# Patient Record
Sex: FEMALE | Race: WHITE | NOT HISPANIC OR LATINO | URBAN - METROPOLITAN AREA
[De-identification: names, ages, dates, MRNs, and addresses within clinical notes are randomized per-mention and may not be internally consistent; named-entity substitution may affect disease eponyms.]

---

## 2023-09-11 PROBLEM — R29.898 COGWHEEL RIGIDITY: Status: ACTIVE | Noted: 2023-09-11

## 2023-09-11 PROBLEM — R87.612 LOW GRADE SQUAMOUS INTRAEPITHELIAL LESION (LGSIL) ON CERVICAL PAP SMEAR: Status: ACTIVE | Noted: 2023-09-11

## 2023-09-11 PROBLEM — N92.6 IRREGULAR MENSTRUAL CYCLE: Status: ACTIVE | Noted: 2023-09-11

## 2023-09-11 PROBLEM — G47.10 HYPERSOMNOLENCE: Status: ACTIVE | Noted: 2023-09-11

## 2023-09-11 PROBLEM — G47.33 OBSTRUCTIVE SLEEP APNEA: Status: ACTIVE | Noted: 2023-09-11

## 2023-09-11 PROBLEM — F31.9 BIPOLAR DISORDER (MULTI): Status: ACTIVE | Noted: 2023-09-11

## 2023-09-11 PROBLEM — G56.00 CARPAL TUNNEL SYNDROME: Status: ACTIVE | Noted: 2023-09-11

## 2023-09-11 PROBLEM — R92.8 ABNORMAL MAMMOGRAM: Status: ACTIVE | Noted: 2023-09-11

## 2023-09-11 PROBLEM — M19.90 ARTHRITIS: Status: ACTIVE | Noted: 2023-09-11

## 2023-09-11 PROBLEM — R53.1 WEAKNESS: Status: ACTIVE | Noted: 2023-09-11

## 2023-09-11 PROBLEM — G20.C PARKINSONISM (MULTI): Status: ACTIVE | Noted: 2023-09-11

## 2023-09-11 PROBLEM — I10 HYPERTENSION: Status: ACTIVE | Noted: 2023-09-11

## 2023-09-11 PROBLEM — K52.9 GASTROENTERITIS: Status: ACTIVE | Noted: 2023-09-11

## 2023-09-11 PROBLEM — K21.9 GERD (GASTROESOPHAGEAL REFLUX DISEASE): Status: ACTIVE | Noted: 2023-09-11

## 2023-09-11 PROBLEM — R49.0 HOARSENESS: Status: ACTIVE | Noted: 2023-09-11

## 2023-09-11 PROBLEM — F32.A DEPRESSION: Status: ACTIVE | Noted: 2023-09-11

## 2023-09-11 PROBLEM — M79.7 FIBROMYALGIA: Status: ACTIVE | Noted: 2023-09-11

## 2023-09-11 PROBLEM — R87.69 ABNORMAL CYTOLOGICAL FINDINGS IN FEMALE GENITAL ORGANS: Status: ACTIVE | Noted: 2023-09-11

## 2023-09-11 PROBLEM — R53.83 FATIGUE: Status: ACTIVE | Noted: 2023-09-11

## 2023-09-11 RX ORDER — VENLAFAXINE HYDROCHLORIDE 150 MG/1
1 CAPSULE, EXTENDED RELEASE ORAL EVERY MORNING
COMMUNITY
Start: 2017-03-02 | End: 2023-10-13

## 2023-09-11 RX ORDER — DEXMETHYLPHENIDATE HYDROCHLORIDE 20 MG/1
CAPSULE, EXTENDED RELEASE ORAL
COMMUNITY
End: 2023-10-13

## 2023-09-11 RX ORDER — CLONAZEPAM 1 MG/1
2 TABLET ORAL DAILY PRN
COMMUNITY
Start: 2016-07-26 | End: 2023-10-13

## 2023-09-11 RX ORDER — TRAZODONE HYDROCHLORIDE 100 MG/1
TABLET ORAL
COMMUNITY
End: 2023-10-13

## 2023-10-13 ENCOUNTER — OFFICE VISIT (OUTPATIENT)
Dept: OBSTETRICS AND GYNECOLOGY | Facility: CLINIC | Age: 58
End: 2023-10-13
Payer: COMMERCIAL

## 2023-10-13 VITALS
DIASTOLIC BLOOD PRESSURE: 89 MMHG | HEART RATE: 76 BPM | BODY MASS INDEX: 37.54 KG/M2 | SYSTOLIC BLOOD PRESSURE: 164 MMHG | WEIGHT: 204 LBS | HEIGHT: 62 IN

## 2023-10-13 DIAGNOSIS — Z01.419 ENCOUNTER FOR GYNECOLOGICAL EXAMINATION WITHOUT ABNORMAL FINDING: ICD-10-CM

## 2023-10-13 DIAGNOSIS — Z12.31 VISIT FOR SCREENING MAMMOGRAM: ICD-10-CM

## 2023-10-13 DIAGNOSIS — N95.1 VAGINAL DRYNESS, MENOPAUSAL: ICD-10-CM

## 2023-10-13 DIAGNOSIS — R10.2 PELVIC PAIN: Primary | ICD-10-CM

## 2023-10-13 PROCEDURE — 1036F TOBACCO NON-USER: CPT | Performed by: OBSTETRICS & GYNECOLOGY

## 2023-10-13 PROCEDURE — 3077F SYST BP >= 140 MM HG: CPT | Performed by: OBSTETRICS & GYNECOLOGY

## 2023-10-13 PROCEDURE — 3079F DIAST BP 80-89 MM HG: CPT | Performed by: OBSTETRICS & GYNECOLOGY

## 2023-10-13 PROCEDURE — 99396 PREV VISIT EST AGE 40-64: CPT | Performed by: OBSTETRICS & GYNECOLOGY

## 2023-10-13 RX ORDER — ESTRADIOL 0.1 MG/G
0.5 CREAM VAGINAL NIGHTLY
Qty: 42.5 G | Refills: 2 | Status: SHIPPED | OUTPATIENT
Start: 2023-10-13 | End: 2024-10-12

## 2023-10-13 NOTE — PROGRESS NOTES
Subjective   Liz Dooley is a 58 y.o. female here for a routine exam. Current complaints: Patient has been stressed regarding her son with special needs now having new [place] seizures.  She does notice a bilateral lower abdominal discomfort.  She is current with her colonoscopy.   She denies any postmenopausal bleeding.  No change in bowel habits, no dysuria or vaginal discharge.  She is  and sexually active.  She has been having some discomfort with intercourse which she attributes to dryness.. Personal health questionnaire reviewed: yes.     Gynecologic History  No LMP recorded.  Contraception: post menopausal status  Last Pap: 10/2021. Results were: normal  Last mammogram: 22. Results were: normal    Obstetric History  OB History    Para Term  AB Living   2 2           SAB IAB Ectopic Multiple Live Births                  # Outcome Date GA Lbr Kam/2nd Weight Sex Delivery Anes PTL Lv   2 Para            1 Para                Objective   Constitutional: Alert and in no acute distress. Well developed, well nourished.   Head and Face: Head and face: Normal.    Eyes: Normal external exam - nonicteric sclera, extraocular movements intact (EOMI) and no ptosis.   Neck: No neck asymmetry. Supple. Thyroid not enlarged and there were no palpable thyroid nodules.    Pulmonary: No respiratory distress.   Chest: Breasts: Normal appearance, no nipple discharge and no skin changes. Palpation of breasts and axillae: No palpable mass and no axillary lymphadenopathy.   Abdomen: Soft nontender; no abdominal mass palpated. No organomegaly. No hernias.   Genitourinary: External genitalia: Normal. No inguinal lymphadenopathy. Bartholin's Urethral and Skenes Glands: Normal. Urethra: Normal.  Bladder: Normal on palpation. Vagina: Normal. Cervix: Normal.  No pap sent. Uterus: Normal.  Right Adnexa/parametria: Normal.  Left Adnexa/parametria: Normal.  Inspection of Perianal Area: Normal.    Musculoskeletal: No joint swelling seen, normal movements of all extremities.   Skin: Normal skin color and pigmentation, normal skin turgor, and no rash.   Neurologic: Non-focal. Grossly intact.   Psychiatric: Alert and oriented x 3. Affect normal to patient baseline. Mood: Appropriate.  Physical Exam     Assessment/Plan   Healthy female exam.  This is a 58-year-old female with a normal exam.  No Pap smear was sent, she is high risk HPV negative.  Her routine mammogram was ordered with tomosynthesis.  We discussed her lower abdominal discomfort.   I did recommend a pelvic ultrasound to evaluate her concerns.  We discussed vaginal atrophy of menopause and estradiol cream was ordered.  I recommend beginning with a pea-sized amount at night for 2 weeks, and then 3 times weekly thereafter.  It could take 8 to 12 weeks along for the full effect.  She will call me with any concerns, I will see her routinely in 1 year.  Mammogram ordered.

## 2023-11-03 ENCOUNTER — HOSPITAL ENCOUNTER (OUTPATIENT)
Dept: RADIOLOGY | Facility: HOSPITAL | Age: 58
Discharge: HOME | End: 2023-11-03
Payer: COMMERCIAL

## 2023-11-03 DIAGNOSIS — R10.2 PELVIC PAIN: ICD-10-CM

## 2023-11-03 PROCEDURE — 76830 TRANSVAGINAL US NON-OB: CPT | Performed by: RADIOLOGY

## 2023-11-03 PROCEDURE — 76856 US EXAM PELVIC COMPLETE: CPT | Performed by: RADIOLOGY

## 2023-11-03 PROCEDURE — 76856 US EXAM PELVIC COMPLETE: CPT

## 2023-11-04 NOTE — RESULT ENCOUNTER NOTE
The ultrasound that was performed due to pain with intercourse is normal.  There are no fibroids or ovarian cysts.  In menopause, often the pain with intercourse is related to dryness and atrophy. Lubrication such as K-Y jelly, Bogdan glide or Uber lube could be helpful for friction, pelvic floor physical therapy for pelvic floor pain, and using estradiol cream for the atrophy.  If you have any new symptoms, such as bleeding, let me know.

## 2023-11-08 ENCOUNTER — APPOINTMENT (OUTPATIENT)
Dept: RADIOLOGY | Facility: HOSPITAL | Age: 58
End: 2023-11-08
Payer: COMMERCIAL

## 2024-01-03 ENCOUNTER — ANCILLARY PROCEDURE (OUTPATIENT)
Dept: RADIOLOGY | Facility: CLINIC | Age: 59
End: 2024-01-03
Payer: COMMERCIAL

## 2024-01-03 DIAGNOSIS — Z12.31 VISIT FOR SCREENING MAMMOGRAM: ICD-10-CM

## 2024-01-03 PROCEDURE — 77067 SCR MAMMO BI INCL CAD: CPT

## 2024-01-03 PROCEDURE — 77063 BREAST TOMOSYNTHESIS BI: CPT | Performed by: RADIOLOGY

## 2024-01-03 PROCEDURE — 77067 SCR MAMMO BI INCL CAD: CPT | Performed by: RADIOLOGY

## 2024-10-18 ENCOUNTER — APPOINTMENT (OUTPATIENT)
Dept: OBSTETRICS AND GYNECOLOGY | Facility: CLINIC | Age: 59
End: 2024-10-18
Payer: COMMERCIAL

## 2024-10-18 VITALS — SYSTOLIC BLOOD PRESSURE: 145 MMHG | HEART RATE: 83 BPM | DIASTOLIC BLOOD PRESSURE: 84 MMHG

## 2024-10-18 DIAGNOSIS — R39.9 UTI SYMPTOMS: ICD-10-CM

## 2024-10-18 DIAGNOSIS — Z01.411 ENCOUNTER FOR GYNECOLOGICAL EXAMINATION WITH ABNORMAL FINDING: ICD-10-CM

## 2024-10-18 DIAGNOSIS — N95.1 VAGINAL DRYNESS, MENOPAUSAL: ICD-10-CM

## 2024-10-18 DIAGNOSIS — R21 SKIN RASH: Primary | ICD-10-CM

## 2024-10-18 PROCEDURE — 88175 CYTOPATH C/V AUTO FLUID REDO: CPT

## 2024-10-18 PROCEDURE — 87624 HPV HI-RISK TYP POOLED RSLT: CPT

## 2024-10-18 RX ORDER — DOXEPIN HYDROCHLORIDE 75 MG/1
75 CAPSULE ORAL NIGHTLY
COMMUNITY

## 2024-10-18 RX ORDER — GABAPENTIN 600 MG/1
1 TABLET ORAL
COMMUNITY
Start: 2024-10-07

## 2024-10-18 RX ORDER — FUROSEMIDE 20 MG/1
20 TABLET ORAL
COMMUNITY
Start: 2024-10-16

## 2024-10-18 RX ORDER — ESTRADIOL 0.1 MG/G
0.5 CREAM VAGINAL NIGHTLY
Qty: 42.5 G | Refills: 2 | Status: SHIPPED | OUTPATIENT
Start: 2024-10-18 | End: 2025-10-18

## 2024-10-18 RX ORDER — NYSTATIN 100000 [USP'U]/G
1 POWDER TOPICAL 3 TIMES DAILY
Qty: 60 G | Refills: 2 | Status: SHIPPED | OUTPATIENT
Start: 2024-10-18 | End: 2025-10-18

## 2024-10-18 RX ORDER — BUSPIRONE HYDROCHLORIDE 10 MG/1
2 TABLET ORAL
COMMUNITY
Start: 2024-09-30

## 2024-10-18 RX ORDER — LOSARTAN POTASSIUM 25 MG/1
25 TABLET ORAL DAILY
COMMUNITY

## 2024-10-18 RX ORDER — FLUOXETINE HYDROCHLORIDE 40 MG/1
80 CAPSULE ORAL DAILY
COMMUNITY

## 2024-10-18 NOTE — PROGRESS NOTES
Subjective   Liz Dooley is a 59 y.o. female here for a routine exam.    There is no postmenopausal bleeding or discharge.  No dysuria or change in bowel habits.    We discussed her recent UTI where the symptoms were lower backache.  She feels that there may be recurrence of symptoms.    She has no fevers or chills.  Review of the chart notes a CT scan in 2024 due to pain that showed no GYN concerns.    We discussed that the visit resulted in a diagnosis of heart failure.    An ultrasound in 2023 showed a normal uterus, endometrium of 3 mm.  There is family history of ovarian cancer in a maternal cousin and a maternal grand mother.    Personal health questionnaire reviewed: yes.     Gynecologic History  No LMP recorded. Patient is postmenopausal.  Contraception: post menopausal status  Last Pap: 10/1/21. Results were: normal  Last mammogram: 1/3/24. Results were: normal    Obstetric History  OB History    Para Term  AB Living   2 2           SAB IAB Ectopic Multiple Live Births                  # Outcome Date GA Lbr Kam/2nd Weight Sex Type Anes PTL Lv   2 Para            1 Para                Objective   Constitutional: Alert and in no acute distress. Well developed, well nourished.   Head and Face: Head and face: Normal.    Eyes: Normal external exam - nonicteric sclera, extraocular movements intact (EOMI) and no ptosis.   Neck: No neck asymmetry. Supple. Thyroid not enlarged and there were no palpable thyroid nodules.    Pulmonary: No respiratory distress.   Chest: Breasts: Normal appearance, no nipple discharge and no skin changes. Palpation of breasts and axillae: No palpable mass and no axillary lymphadenopathy.   Abdomen: Soft nontender; no abdominal mass palpated. No organomegaly. No hernias.   Genitourinary: External genitalia: Normal. No inguinal lymphadenopathy. Bartholin's Urethral and Skenes Glands: Normal. Urethra: Normal.  Bladder: Normal on palpation. Vagina:  Normal. Cervix: Normal.  Uterus: Normal.  Right Adnexa/parametria: Normal.  Left Adnexa/parametria: Normal.  Inspection of Perianal Area: Normal.   Musculoskeletal: No joint swelling seen, normal movements of all extremities.   Skin: Normal skin color and pigmentation, normal skin turgor.  A red rash was noted in the fold of the abdominal pannus.  Neurologic: Non-focal. Grossly intact.   Psychiatric: Alert and oriented x 3. Affect normal to patient baseline. Mood: Appropriate.  Physical Exam     Assessment/Plan   Healthy female exam.  This is a 59-year-old female with a normal exam.  A Pap smear was sent.    Her routine mammogram is due in January 2025.    We discussed her recent UTI with possible recurrent symptoms.  A urine culture was ordered in [facility].    We discussed using estradiol cream for the genitourinary syndrome of menopause.  Reassurance was given regarding family history of ovarian cancer, as recent imaging with CT scan and ultrasound showed no ovarian concerns.    Nystatin powder was ordered to treat skin rash in the pannus.  The estradiol cream will be a pea-sized amount at the vaginal opening 3 times weekly.    I will see her routinely in 1 year.  Mammogram ordered.

## 2025-01-03 ENCOUNTER — APPOINTMENT (OUTPATIENT)
Dept: RADIOLOGY | Facility: CLINIC | Age: 60
End: 2025-01-03
Payer: COMMERCIAL

## 2025-01-23 ENCOUNTER — HOSPITAL ENCOUNTER (OUTPATIENT)
Dept: RADIOLOGY | Facility: CLINIC | Age: 60
Discharge: HOME | End: 2025-01-23
Payer: COMMERCIAL

## 2025-01-23 VITALS — HEIGHT: 62 IN | BODY MASS INDEX: 37.53 KG/M2 | WEIGHT: 203.93 LBS

## 2025-01-23 DIAGNOSIS — Z01.411 ENCOUNTER FOR GYNECOLOGICAL EXAMINATION WITH ABNORMAL FINDING: ICD-10-CM

## 2025-01-23 PROCEDURE — 77067 SCR MAMMO BI INCL CAD: CPT

## 2025-10-31 ENCOUNTER — APPOINTMENT (OUTPATIENT)
Dept: OBSTETRICS AND GYNECOLOGY | Facility: CLINIC | Age: 60
End: 2025-10-31
Payer: COMMERCIAL